# Patient Record
Sex: MALE | Race: WHITE | Employment: FULL TIME | URBAN - METROPOLITAN AREA
[De-identification: names, ages, dates, MRNs, and addresses within clinical notes are randomized per-mention and may not be internally consistent; named-entity substitution may affect disease eponyms.]

---

## 2020-12-15 ENCOUNTER — HOSPITAL ENCOUNTER (INPATIENT)
Facility: HOSPITAL | Age: 50
LOS: 1 days | Discharge: HOME | DRG: 176 | End: 2020-12-16
Attending: EMERGENCY MEDICINE | Admitting: HOSPITALIST
Payer: COMMERCIAL

## 2020-12-15 ENCOUNTER — APPOINTMENT (OUTPATIENT)
Dept: ULTRASOUND IMAGING | Facility: HOSPITAL | Age: 50
DRG: 176 | End: 2020-12-15
Attending: INTERNAL MEDICINE
Payer: COMMERCIAL

## 2020-12-15 ENCOUNTER — APPOINTMENT (OUTPATIENT)
Dept: CT IMAGING | Facility: HOSPITAL | Age: 50
DRG: 176 | End: 2020-12-15
Attending: EMERGENCY MEDICINE
Payer: COMMERCIAL

## 2020-12-15 ENCOUNTER — APPOINTMENT (OUTPATIENT)
Dept: GENERAL RADIOLOGY | Facility: HOSPITAL | Age: 50
DRG: 176 | End: 2020-12-15
Attending: EMERGENCY MEDICINE
Payer: COMMERCIAL

## 2020-12-15 DIAGNOSIS — I26.99 OTHER ACUTE PULMONARY EMBOLISM WITHOUT ACUTE COR PULMONALE (HCC): Primary | ICD-10-CM

## 2020-12-15 DIAGNOSIS — I26.99 PULMONARY INFARCT (HCC): ICD-10-CM

## 2020-12-15 PROCEDURE — 71260 CT THORAX DX C+: CPT | Performed by: EMERGENCY MEDICINE

## 2020-12-15 PROCEDURE — 99223 1ST HOSP IP/OBS HIGH 75: CPT | Performed by: HOSPITALIST

## 2020-12-15 PROCEDURE — 99222 1ST HOSP IP/OBS MODERATE 55: CPT | Performed by: INTERNAL MEDICINE

## 2020-12-15 PROCEDURE — 93970 EXTREMITY STUDY: CPT | Performed by: INTERNAL MEDICINE

## 2020-12-15 PROCEDURE — 71045 X-RAY EXAM CHEST 1 VIEW: CPT | Performed by: EMERGENCY MEDICINE

## 2020-12-15 RX ORDER — HEPARIN SODIUM 1000 [USP'U]/ML
80 INJECTION, SOLUTION INTRAVENOUS; SUBCUTANEOUS ONCE
Status: COMPLETED | OUTPATIENT
Start: 2020-12-15 | End: 2020-12-15

## 2020-12-15 RX ORDER — POTASSIUM CHLORIDE 20 MEQ/1
40 TABLET, EXTENDED RELEASE ORAL ONCE
Status: COMPLETED | OUTPATIENT
Start: 2020-12-15 | End: 2020-12-15

## 2020-12-15 RX ORDER — HEPARIN SODIUM AND DEXTROSE 10000; 5 [USP'U]/100ML; G/100ML
18 INJECTION INTRAVENOUS ONCE
Status: DISCONTINUED | OUTPATIENT
Start: 2020-12-15 | End: 2020-12-15

## 2020-12-15 RX ORDER — HEPARIN SODIUM AND DEXTROSE 10000; 5 [USP'U]/100ML; G/100ML
INJECTION INTRAVENOUS CONTINUOUS
Status: DISCONTINUED | OUTPATIENT
Start: 2020-12-15 | End: 2020-12-15

## 2020-12-15 NOTE — PLAN OF CARE
Problem: Patient Centered Care  Goal: Patient preferences are identified and integrated in the patient's plan of care  Description: Interventions:  - What would you like us to know as we care for you?  I would like to go home as soon as possible  - Provid retention  12/15/2020 1428 by Brina Wisdom RN  Outcome: Progressing    Patient is alert and orientated x4, on RA, VSS. Patient was bridged over from heparin gtt to xarelto. Patient is resting comfortable in bed at lowest position with bed alarm in place.  Gi

## 2020-12-15 NOTE — ED NOTES
Orders for admission, patient is aware of plan and ready to go upstairs, any questions, please call ED KRISTY Saldivar at ext: [phone]

## 2020-12-15 NOTE — PROGRESS NOTES
Inpatient to Inpatient Transfer    Reason for Transfer:  PE, on heparin gtt. Patient's Family Notified of Transfer and Given Unit Phone Number/Visiting Hours:  Yes, female  present with patient. Patient Detoxing?   No    Comments:  Patient

## 2020-12-15 NOTE — ED INITIAL ASSESSMENT (HPI)
Pt c/o left-sided rib pain for the past few days, reports hx rib injuries years ago, denies any recent trauma.

## 2020-12-15 NOTE — CONSULTS
[facility]    Consult Note    Date:  12/15/2020  Date of Admission:  12/15/2020      Chief Complaint:   Pollo Shipley is a(n) 48year old male with left chest pain. HPI:   The patient has no significant past medical history.   He has never b and no mass appreciable. Extremities without clubbing cyanosis nor edema. No palpable cord. Neurologic grossly intact with symmetric tone and strength and reflex.   Skin without gross abnormality    Results:     Lab Results   Component Value Date    WBC Assessment/Plan:   1. Venous thromboembolism–the patient has a mild clinical syndrome associated with pulmonary infarction and is hemodynamically stable. There is no evidence of right ventricular strain on scanning.   I think it reasonable to go dir

## 2020-12-15 NOTE — ED PROVIDER NOTES
Patient Seen in: [facility] Emergency Department      History   No chief complaint on file.     Stated Complaint: Rib pain    HPI    55-year-old male with no significant medical history presents to the emergency department for evaluation of left-si normal distal pulses  Pulmonary/Chest: CTA b/l with no rales, wheezes. No chest wall tenderness  Abdominal: Nontender. Nondistended. Soft. Bowel sounds are normal.   Back:   : Musculoskeletal: Normal range of motion. No deformity.    Lymphadenopathy: Luz Mendez MD on 12/15/2020 at 7:37 AM          Ct Chest Pe Aorta (iv Only) (cpt=71260)    Result Date: 12/15/2020  CONCLUSION:  1.  Positive for acute-appearing pulmonary emboli within the distal main left pulmonary artery that demonstrate lobar, segmental, an accepted patient for admission.     Critical care time exclusive of procedure time spent on this patient was 40 min for taking history from patient examining patient, medical decision-making, reviewing lab work and radiology studies, explaining results to p

## 2020-12-16 VITALS
TEMPERATURE: 98 F | DIASTOLIC BLOOD PRESSURE: 76 MMHG | WEIGHT: 212 LBS | SYSTOLIC BLOOD PRESSURE: 126 MMHG | HEIGHT: 73 IN | RESPIRATION RATE: 18 BRPM | HEART RATE: 66 BPM | OXYGEN SATURATION: 93 % | BODY MASS INDEX: 28.1 KG/M2

## 2020-12-16 PROCEDURE — 99239 HOSP IP/OBS DSCHRG MGMT >30: CPT | Performed by: HOSPITALIST

## 2020-12-16 PROCEDURE — 99232 SBSQ HOSP IP/OBS MODERATE 35: CPT | Performed by: INTERNAL MEDICINE

## 2020-12-16 RX ORDER — ACETAMINOPHEN 325 MG/1
650 TABLET ORAL EVERY 6 HOURS PRN
Status: DISCONTINUED | OUTPATIENT
Start: 2020-12-16 | End: 2020-12-16

## 2020-12-16 RX ORDER — HYDROCODONE BITARTRATE AND ACETAMINOPHEN 5; 325 MG/1; MG/1
1 TABLET ORAL EVERY 6 HOURS PRN
Status: DISCONTINUED | OUTPATIENT
Start: 2020-12-16 | End: 2020-12-16

## 2020-12-16 RX ORDER — HYDROCODONE BITARTRATE AND ACETAMINOPHEN 5; 325 MG/1; MG/1
1 TABLET ORAL EVERY 6 HOURS PRN
Qty: 20 TABLET | Refills: 0 | Status: SHIPPED | OUTPATIENT
Start: 2020-12-16 | End: 2021-04-27

## 2020-12-16 RX ORDER — ACETAMINOPHEN 325 MG/1
650 TABLET ORAL EVERY 6 HOURS PRN
Refills: 0 | Status: SHIPPED | COMMUNITY
Start: 2020-12-16 | End: 2021-01-08

## 2020-12-16 NOTE — H&P
[facility]    PATIENT'S NAME: CARO LEPE   ATTENDING PHYSICIAN: Shine Oakes MD   PATIENT ACCOUNT#:   [id number]    LOCATION:  [address] [place] [address] RECORD #:   [id number]       YOB: 1970  ADMISSION DATE:       12/15/2020 refer to the patient's chart for details regarding the patient's home medications. ALLERGIES:  No known drug allergies. FAMILY HISTORY:  Currently unknown.       REVIEW OF SYSTEMS:  A 10-point review of systems has been obtained and was otherwise understanding of the information given. Currently, the patient is on room air. We will continue to monitor and continue analgesics as needed. 2.   Left lower extremity deep venous thrombosis: We will continue anticoagulation as above.   Appreciate rec

## 2020-12-16 NOTE — PROGRESS NOTES
[facility]    Progress Note      Assessment and Plan:   1. Venous thromboembolism–the patient has a mild clinical syndrome associated with pulmonary infarction and is hemodynamically stable.   There is no evidence of right ventricular strai 12/16/2020    CREATSERUM 0.96 12/16/2020    BUN 17 12/16/2020     12/16/2020    K 4.3 12/16/2020     12/16/2020    CO2 25.0 12/16/2020     12/16/2020    CA 9.3 12/16/2020     Lower extremity venous ultrasound–left popliteal thrombus    P

## 2020-12-16 NOTE — CM/SW NOTE
Received MDO for Xarelto - Dr. Jackie Meehan called into pt's pharmacy, pt OOP cost will be $95.    SW obtained Xarelto coupon and provided to pt in his room. SW provided pt w/ instructions to bring coupon to his Pharmacy.  SW informed pt of OOP expense for $95

## 2020-12-16 NOTE — DISCHARGE SUMMARY
[facility]    Discharge Summary    Migdalia Anthony Patient Status:  Inpatient    7/10/1970 MRN [id number]   [facility] 3W/SW Attending Soniya Blunt MD   Hosp Day # 1 PCP None Pcp     Date of Admission: 12/15/2020     D contacts. Is endorsing pleuritic left-sided chest pain.        DISCHARGE DX: unprovoked DVT, PE  No clear reason for his symptoms  Discussed at length this could be caused by occult malignancy.   Jeffery Muhammad was on speakerphone  Gave clear instructions to take X 12/15/2020  CONCLUSION:  1. Positive for acute-appearing pulmonary emboli within the distal main left pulmonary artery that demonstrate lobar, segmental, and subsegmental branch propagation into the left upper and left lower lobes.   Additional right upper 12/16/20  0709   * 118*   BUN 12 17   CREATSERUM 0.95 0.96   GFRAA 107 106   GFRNAA 93 92   CA 9.7 9.3    139   K 3.8 4.3    107   CO2 24.0 25.0     Lab Results   Component Value Date    INR 1.13 12/15/2020       Discharge Medications: Discharge References/Attachments    Rivaroxaban oral tablets (English)         ----------------------------------------------------  >30 MIN SPENT ON THIS DC   GAYLA PEOPLES  12/16/2020  9:24 AM

## 2020-12-16 NOTE — PLAN OF CARE
Pt c/o HA-- MD notified. Resolved after PRN Tylenol. Discharging back home today. All questions answered to the best of my ability.      Problem: Patient Centered Care  Goal: Patient preferences are identified and integrated in the patient's plan of care  D anxiety  - Monitor for signs/symptoms of CO2 retention  Outcome: Completed     Problem: PAIN - ADULT  Goal: Verbalizes/displays adequate comfort level or patient's stated pain goal  Description: INTERVENTIONS:  - Encourage pt to monitor pain and request as

## 2020-12-16 NOTE — DISCHARGE PLANNING
Patient was provided with discharge instructions, education, and follow up information; patient's significant other Nandini Wilkins was also present for discharge instructions with patient's consent.  Prescriptions were already sent electronically to patient's pharma

## 2020-12-16 NOTE — PLAN OF CARE
Patient resting comfortably overnight. Pain to left side of ribcage, declined the need for pain medication. No signs or symptoms of bleeding noted. Up independently. Voiding freely. Plan for possible d/c home today.     Problem: Patient Centered Care  Goal: respiratory difficulty  - Respiratory Therapy support as indicated  - Manage/alleviate anxiety  - Monitor for signs/symptoms of CO2 retention  Outcome: Progressing

## 2020-12-17 ENCOUNTER — PATIENT OUTREACH (OUTPATIENT)
Dept: CASE MANAGEMENT | Age: 50
End: 2020-12-17

## 2020-12-17 DIAGNOSIS — Z02.9 ENCOUNTERS FOR UNSPECIFIED ADMINISTRATIVE PURPOSE: ICD-10-CM

## 2020-12-17 NOTE — PAYOR COMM NOTE
--------------  ADMISSION REVIEW     Payor: Johnnie Arcos #:  [id number]  Authorization Number: [id number]    Admit date: 12/15/20  Admit time: [address]       Admitting Physician: Jolyn Libman, MD  Attending Physician:  No att. provide PERRLA  Neck: Normal range of motion. Neck supple. No tracheal deviation present. CV: s1s2+, RRR, no m/r/g, normal distal pulses  Pulmonary/Chest: CTA b/l with no rales, wheezes. No chest wall tenderness  Abdominal: Nontender. Nondistended. Soft.  Bowel Dictated by (CST): Rebekah Lopez MD on 12/15/2020 at 7:34 AM     Finalized by (CST): Rebekah Lopez MD on 12/15/2020 at 7:37 AM          Ct Chest Pe Aorta (iv Only) (cpt=71260)    Result Date: 12/15/2020  CONCLUSION:  1.  Positive for acute-appearing pul spent on this patient was 40 min for taking history from patient examining patient, medical decision-making, reviewing lab work and radiology studies, explaining results to patient and family, discussing with consultants/admitting physician, and documentin pulmonary emboli were noted. The patient was also found to have a large 7 cm wedge shaped peripheral left basilar opacity in left lower lobe and lingula, most likely related to a pulmonary infarct. Pulmonology was placed on consult.     The patient was st No evidence of DVT in the right lower extremity. ASSESSMENT AND PLAN:  The patient is a 80-year-old male with no significant past medical history who has come to the hospital endorsing pleuritic chest pain.       1.   Pleuritic chest pain:  Most likely

## 2020-12-21 NOTE — PAYOR COMM NOTE
--------------  DISCHARGE REVIEW    Payor: Johnnie Sainz  #:  [id number]  Authorization Number: [id number]    Admit date: 12/15/20  Admit time:  8684  Discharge Date: 12/16/2020 12:20 PM     Admitting Physician: Brody Rubio MD  At in left lower lobe and lingula, most likely related to a pulmonary infarct. Pulmonology was placed on consult. The patient was started on heparin drip. Pulmonology recommended the patient be changed over to Xarelto.   The patient was seen and examined on 12/15/2020 at 1:47 PM     Finalized by (CST): Estefania Ac MD on 12/15/2020 at 1:50 PM          Xr Chest Ap Portable  (cpt=71045)    Result Date: 12/15/2020  CONCLUSION:  1.  Poorly defined 4.1 x 3.0 cm opacity at the left lung base may be pleural based, MD on 12/15/2020 at 10:01 AM            LABS :     Lab Results   Component Value Date    WBC 8.4 12/16/2020    HGB 16.2 12/16/2020    HCT 47.5 12/16/2020    .0 12/16/2020    CREATSERUM 0.96 12/16/2020    BUN 17 12/16/2020     12/16/2020    K 4 Hours: 24-hours Phone: [phone]   · HYDROcodone-acetaminophen 5-325 MG Tabs  · Rivaroxaban 20 MG Tabs         Follow up Visits  MD Jose Barber 8141 [phone]    Schedule an appointment as soon as possible

## 2020-12-22 ENCOUNTER — TELEPHONE (OUTPATIENT)
Dept: PULMONOLOGY | Facility: CLINIC | Age: 50
End: 2020-12-22

## 2020-12-22 ENCOUNTER — OFFICE VISIT (OUTPATIENT)
Dept: INTERNAL MEDICINE CLINIC | Facility: CLINIC | Age: 50
End: 2020-12-22
Payer: COMMERCIAL

## 2020-12-22 ENCOUNTER — PATIENT OUTREACH (OUTPATIENT)
Dept: CASE MANAGEMENT | Age: 50
End: 2020-12-22

## 2020-12-22 VITALS
HEART RATE: 78 BPM | RESPIRATION RATE: 18 BRPM | OXYGEN SATURATION: 99 % | BODY MASS INDEX: 27.17 KG/M2 | HEIGHT: 73 IN | WEIGHT: 205 LBS | SYSTOLIC BLOOD PRESSURE: 132 MMHG | TEMPERATURE: 98 F | DIASTOLIC BLOOD PRESSURE: 88 MMHG

## 2020-12-22 DIAGNOSIS — R73.09 ELEVATED GLUCOSE LEVEL: ICD-10-CM

## 2020-12-22 DIAGNOSIS — R93.89 ABNORMAL CT OF THE CHEST: Primary | ICD-10-CM

## 2020-12-22 DIAGNOSIS — Z12.5 SCREENING PSA (PROSTATE SPECIFIC ANTIGEN): ICD-10-CM

## 2020-12-22 DIAGNOSIS — Z28.21 IMMUNIZATION REFUSED: ICD-10-CM

## 2020-12-22 DIAGNOSIS — Z00.00 ENCOUNTER FOR PREVENTIVE CARE: ICD-10-CM

## 2020-12-22 DIAGNOSIS — Z71.6 ENCOUNTER FOR SMOKING CESSATION COUNSELING: ICD-10-CM

## 2020-12-22 DIAGNOSIS — R53.83 OTHER FATIGUE: ICD-10-CM

## 2020-12-22 DIAGNOSIS — I26.99 OTHER PULMONARY EMBOLISM WITHOUT ACUTE COR PULMONALE, UNSPECIFIED CHRONICITY (HCC): Primary | ICD-10-CM

## 2020-12-22 DIAGNOSIS — Z76.89 ENCOUNTER TO ESTABLISH CARE: ICD-10-CM

## 2020-12-22 PROCEDURE — 3079F DIAST BP 80-89 MM HG: CPT | Performed by: INTERNAL MEDICINE

## 2020-12-22 PROCEDURE — 3075F SYST BP GE 130 - 139MM HG: CPT | Performed by: INTERNAL MEDICINE

## 2020-12-22 PROCEDURE — 99205 OFFICE O/P NEW HI 60 MIN: CPT | Performed by: INTERNAL MEDICINE

## 2020-12-22 PROCEDURE — 1111F DSCHRG MED/CURRENT MED MERGE: CPT | Performed by: INTERNAL MEDICINE

## 2020-12-22 PROCEDURE — 3008F BODY MASS INDEX DOCD: CPT | Performed by: INTERNAL MEDICINE

## 2020-12-22 NOTE — PROGRESS NOTES
Patient called requesting assistance with scheduling.                 Renay Crow MD  Pulmonary   [address]  [phone]       's office contacted patient appointment made  On Jan 18th @ 4:45       Closing en

## 2020-12-22 NOTE — TELEPHONE ENCOUNTER
Spoke to patient regarding message below. Appointment scheduled 1/18/20 at 4:45 pm.  Verified date, time, place and where to park. Inquiring about CT scan of the chest at 2-month interval.  Last CT Chest 12/15/2020. No future order in system for CT. Dr. Gloria Husain- please advise.

## 2020-12-22 NOTE — TELEPHONE ENCOUNTER
Bear Negro requesting patient to be seen in one month for hospital follow up. Please call. Thank you.

## 2020-12-23 NOTE — TELEPHONE ENCOUNTER
Spoke to patient regarding message below. CT Chest ordered and patient informed to call central scheduling to schedule CT @ [phone]. Patient verbalized understanding. CT CHEST placed in chronic calendar.

## 2020-12-23 NOTE — PROGRESS NOTES
HPI:    Patient ID: John Chaparro is a 48year old male. HPI   Patient here to establish care from the hospital where he was admitted with acute pulmonary embolism also DVT to lower extremity. Patient was sent home with rivaroxaban.   Overall doing okay sh Known Allergies    HISTORY:  Past Medical History:   Diagnosis Date   • Anxiety state    • Depression       No past surgical history on file. No family history on file.    Social History: Social History    Tobacco Use      Smoking status: Current Every Da (primary encounter diagnosis)- stable no  AC< no cp, o2 is good   Encounter for preventive care- will refer to gI for screening colonoscopy  Encounter for smoking cessation counseling- doing ok, not smoking,g using only menthol weep no ni  Elevated glucose

## 2020-12-28 ENCOUNTER — TELEPHONE (OUTPATIENT)
Dept: INTERNAL MEDICINE CLINIC | Facility: CLINIC | Age: 50
End: 2020-12-28

## 2020-12-28 NOTE — TELEPHONE ENCOUNTER
Dr Kobi Burton, please advise. Jeannette Mcleod called for patient. Seen in OV 12/22/2020. Was to return to work today, but stated patient is still not feeling well, and he has a couple appointments for follow-up this week.  He is breathing \"ok\" but he is still having

## 2020-12-29 ENCOUNTER — OFFICE VISIT (OUTPATIENT)
Dept: HEMATOLOGY/ONCOLOGY | Facility: HOSPITAL | Age: 50
End: 2020-12-29
Attending: INTERNAL MEDICINE
Payer: COMMERCIAL

## 2020-12-29 VITALS
BODY MASS INDEX: 27.17 KG/M2 | OXYGEN SATURATION: 98 % | SYSTOLIC BLOOD PRESSURE: 135 MMHG | WEIGHT: 205 LBS | HEIGHT: 73 IN | TEMPERATURE: 98 F | HEART RATE: 63 BPM | DIASTOLIC BLOOD PRESSURE: 80 MMHG | RESPIRATION RATE: 16 BRPM

## 2020-12-29 DIAGNOSIS — Z71.6 ENCOUNTER FOR SMOKING CESSATION COUNSELING: ICD-10-CM

## 2020-12-29 DIAGNOSIS — Z87.891 HISTORY OF TOBACCO USE: ICD-10-CM

## 2020-12-29 DIAGNOSIS — I26.99 OTHER PULMONARY EMBOLISM WITHOUT ACUTE COR PULMONALE, UNSPECIFIED CHRONICITY (HCC): Primary | ICD-10-CM

## 2020-12-29 DIAGNOSIS — R31.29 MICROSCOPIC HEMATURIA: ICD-10-CM

## 2020-12-29 LAB
ALBUMIN SERPL-MCNC: 4 G/DL (ref 3.4–5)
ALBUMIN/GLOB SERPL: 0.9 {RATIO} (ref 1–2)
ALP LIVER SERPL-CCNC: 82 U/L
ALT SERPL-CCNC: 28 U/L
ANION GAP SERPL CALC-SCNC: 4 MMOL/L (ref 0–18)
AST SERPL-CCNC: 14 U/L (ref 15–37)
BACTERIA UR QL AUTO: NEGATIVE /HPF
BILIRUB SERPL-MCNC: 1 MG/DL (ref 0.1–2)
BILIRUB UR QL: NEGATIVE
BUN BLD-MCNC: 16 MG/DL (ref 7–18)
BUN/CREAT SERPL: 15.1 (ref 10–20)
CALCIUM BLD-MCNC: 9.5 MG/DL (ref 8.5–10.1)
CHLORIDE SERPL-SCNC: 108 MMOL/L (ref 98–112)
CLARITY UR: CLEAR
CO2 SERPL-SCNC: 28 MMOL/L (ref 21–32)
COLOR UR: YELLOW
COMPLEXED PSA SERPL-MCNC: 0.84 NG/ML (ref ?–4)
CREAT BLD-MCNC: 1.06 MG/DL
GLOBULIN PLAS-MCNC: 4.3 G/DL (ref 2.8–4.4)
GLUCOSE BLD-MCNC: 97 MG/DL (ref 70–99)
GLUCOSE UR-MCNC: NEGATIVE MG/DL
KETONES UR-MCNC: NEGATIVE MG/DL
LEUKOCYTE ESTERASE UR QL STRIP.AUTO: NEGATIVE
M PROTEIN MFR SERPL ELPH: 8.3 G/DL (ref 6.4–8.2)
NITRITE UR QL STRIP.AUTO: NEGATIVE
OSMOLALITY SERPL CALC.SUM OF ELEC: 291 MOSM/KG (ref 275–295)
PATIENT FASTING Y/N/NP: NO
PH UR: 5 [PH] (ref 5–8)
POTASSIUM SERPL-SCNC: 4.2 MMOL/L (ref 3.5–5.1)
PROT UR-MCNC: NEGATIVE MG/DL
RBC #/AREA URNS AUTO: 9 /HPF
SODIUM SERPL-SCNC: 140 MMOL/L (ref 136–145)
SP GR UR STRIP: 1.02 (ref 1–1.03)
UROBILINOGEN UR STRIP-ACNC: <2
WBC #/AREA URNS AUTO: 0 /HPF

## 2020-12-29 PROCEDURE — 99205 OFFICE O/P NEW HI 60 MIN: CPT | Performed by: INTERNAL MEDICINE

## 2020-12-29 NOTE — PROGRESS NOTES
Hematology Consultation Note    Patient Name: Art Harman   YOB: 1970   Medical Record Number: [id number]   CSN: [id number]   Consulting Physician: Romelia Raymundo MD  Referring Physician(s): Margeret Claude, MD  Date of Consultation: 12/29/2020 an event. He denies any systemic signs of illness. His review of systems is otherwise negative.     Past Medical History:  Past Medical History:   Diagnosis Date   • Anxiety state    • Depression    • [facility] 12/15/2020   • Pulm on file        Relationship status: Not on file      Intimate partner violence        Fear of current or ex partner: Not on file        Emotionally abused: Not on file        Physically abused: Not on file        Forced sexual activity: Not on file    Othe 1\")   Wt 93 kg (205 lb)   SpO2 98%   BMI 27.05 kg/m²     Physical Examination:    General: Patient is alert and oriented x 3, not in acute distress. Psych:  Friendly, cooperative with appropriate questions and responses  HEENT: EOMs intact.  Oropharynx is distal main left pulmonary artery that demonstrate lobar, segmental, and subsegmental branch propagation into the left upper and left lower lobes. Additional right upper lobe segmental branch   pulmonary emboli. No CT evidence of right heart strain.   2. underlying mass cannot be excluded.  He has been seen by Dr. Felicita Carter in pulmonary for this finding who has already scheduled this pt for repeat CT chest imaging scan    --I have reviewed with the patient & his long-term girlfriend that this appears to be an u Margie to r/o a possible underlying mass    --as this was an unprovoked DVT/PE, Sanchez Salgado is aware that he should remain on Eliquis for at least 6 months. He is tolerating this medication without any bleeding or bruising complications.   Following 6 months of fu

## 2020-12-29 NOTE — PROGRESS NOTES
Multiple attempts to reach the pt and messages left with no returned phone call. Pt went to HFU with PCP on 12/22/20, closing encounter.

## 2021-01-02 ENCOUNTER — TELEPHONE (OUTPATIENT)
Dept: INTERNAL MEDICINE CLINIC | Facility: CLINIC | Age: 51
End: 2021-01-02

## 2021-01-02 NOTE — TELEPHONE ENCOUNTER
Patti Rahman reports patient was given work note to return to work on 1/4/20 however still not feeling well and requesting to postpone and return to work on 1/11, will need note by today, pended under communications for review please advise.     Patti Rahman requesting c

## 2021-01-02 NOTE — TELEPHONE ENCOUNTER
Work note faxed to employer, confirmation received, copy sent to [employer]. Sharona Flynn has been advised.

## 2021-01-06 ENCOUNTER — LAB ENCOUNTER (OUTPATIENT)
Dept: LAB | Age: 51
End: 2021-01-06
Attending: INTERNAL MEDICINE
Payer: COMMERCIAL

## 2021-01-06 DIAGNOSIS — R77.8 ELEVATED TOTAL PROTEIN: ICD-10-CM

## 2021-01-06 DIAGNOSIS — R31.9 HEMATURIA, UNSPECIFIED TYPE: ICD-10-CM

## 2021-01-06 LAB
BACTERIA UR QL AUTO: NEGATIVE /HPF
BILIRUB UR QL: NEGATIVE
CHOLEST SMN-MCNC: 211 MG/DL (ref ?–200)
CLARITY UR: CLEAR
COLOR UR: YELLOW
EST. AVERAGE GLUCOSE BLD GHB EST-MCNC: 126 MG/DL (ref 68–126)
GLUCOSE UR-MCNC: NEGATIVE MG/DL
HBA1C MFR BLD HPLC: 6 % (ref ?–5.7)
HDLC SERPL-MCNC: 57 MG/DL (ref 40–59)
KETONES UR-MCNC: NEGATIVE MG/DL
LDLC SERPL CALC-MCNC: 107 MG/DL (ref ?–100)
LEUKOCYTE ESTERASE UR QL STRIP.AUTO: NEGATIVE
NITRITE UR QL STRIP.AUTO: NEGATIVE
NONHDLC SERPL-MCNC: 154 MG/DL (ref ?–130)
PATIENT FASTING Y/N/NP: YES
PH UR: 6 [PH] (ref 5–8)
PROT UR-MCNC: NEGATIVE MG/DL
RBC #/AREA URNS AUTO: 5 /HPF
SP GR UR STRIP: 1.02 (ref 1–1.03)
TRIGL SERPL-MCNC: 237 MG/DL (ref 30–149)
TSI SER-ACNC: 1.12 MIU/ML (ref 0.36–3.74)
UROBILINOGEN UR STRIP-ACNC: <2
VLDLC SERPL CALC-MCNC: 47 MG/DL (ref 0–30)
WBC #/AREA URNS AUTO: <1 /HPF

## 2021-01-06 PROCEDURE — 83036 HEMOGLOBIN GLYCOSYLATED A1C: CPT | Performed by: INTERNAL MEDICINE

## 2021-01-06 PROCEDURE — 84443 ASSAY THYROID STIM HORMONE: CPT | Performed by: INTERNAL MEDICINE

## 2021-01-06 PROCEDURE — 84165 PROTEIN E-PHORESIS SERUM: CPT

## 2021-01-06 PROCEDURE — 36415 COLL VENOUS BLD VENIPUNCTURE: CPT | Performed by: INTERNAL MEDICINE

## 2021-01-06 PROCEDURE — 80061 LIPID PANEL: CPT | Performed by: INTERNAL MEDICINE

## 2021-01-06 PROCEDURE — 81001 URINALYSIS AUTO W/SCOPE: CPT | Performed by: INTERNAL MEDICINE

## 2021-01-06 PROCEDURE — 88108 CYTOPATH CONCENTRATE TECH: CPT

## 2021-01-07 ENCOUNTER — HOSPITAL ENCOUNTER (OUTPATIENT)
Dept: CT IMAGING | Facility: HOSPITAL | Age: 51
Discharge: HOME | End: 2021-01-07
Attending: INTERNAL MEDICINE
Payer: COMMERCIAL

## 2021-01-07 DIAGNOSIS — Z87.891 HISTORY OF TOBACCO USE: ICD-10-CM

## 2021-01-07 DIAGNOSIS — R31.29 MICROSCOPIC HEMATURIA: ICD-10-CM

## 2021-01-07 DIAGNOSIS — Z71.6 ENCOUNTER FOR SMOKING CESSATION COUNSELING: ICD-10-CM

## 2021-01-07 LAB
ALBUMIN SERPL ELPH-MCNC: 4.43 G/DL (ref 3.75–5.21)
ALBUMIN/GLOB SERPL: 1.44 {RATIO} (ref 1–2)
ALPHA1 GLOB SERPL ELPH-MCNC: 0.3 G/DL (ref 0.19–0.46)
ALPHA2 GLOB SERPL ELPH-MCNC: 0.75 G/DL (ref 0.48–1.05)
B-GLOBULIN SERPL ELPH-MCNC: 0.92 G/DL (ref 0.68–1.23)
GAMMA GLOB SERPL ELPH-MCNC: 1.1 G/DL (ref 0.62–1.7)
M PROTEIN MFR SERPL ELPH: 7.5 G/DL (ref 6.4–8.2)
NON GYNE INTERPRETATION: NEGATIVE

## 2021-01-07 PROCEDURE — 76377 3D RENDER W/INTRP POSTPROCES: CPT | Performed by: INTERNAL MEDICINE

## 2021-01-07 PROCEDURE — 74178 CT ABD&PLV WO CNTR FLWD CNTR: CPT | Performed by: INTERNAL MEDICINE

## 2021-01-07 NOTE — PROGRESS NOTES
The BS and the cholesterol are  on the higher side, will need to watch diet, cut down on carbs, rice, pasta, potatoes, one cup per serving, bread 2 slices wheat better than white, no sodas or othr sugary drinks, cut down on fried fatty food, eat more green

## 2021-01-08 ENCOUNTER — OFFICE VISIT (OUTPATIENT)
Dept: INTERNAL MEDICINE CLINIC | Facility: CLINIC | Age: 51
End: 2021-01-08
Payer: COMMERCIAL

## 2021-01-08 ENCOUNTER — NURSE TRIAGE (OUTPATIENT)
Dept: INTERNAL MEDICINE CLINIC | Facility: CLINIC | Age: 51
End: 2021-01-08

## 2021-01-08 VITALS
BODY MASS INDEX: 28.1 KG/M2 | HEIGHT: 73 IN | DIASTOLIC BLOOD PRESSURE: 74 MMHG | RESPIRATION RATE: 18 BRPM | OXYGEN SATURATION: 99 % | HEART RATE: 85 BPM | TEMPERATURE: 98 F | WEIGHT: 212 LBS | SYSTOLIC BLOOD PRESSURE: 112 MMHG

## 2021-01-08 DIAGNOSIS — R77.8 ELEVATED TOTAL PROTEIN: ICD-10-CM

## 2021-01-08 DIAGNOSIS — T78.40XA ALLERGIC REACTION, INITIAL ENCOUNTER: ICD-10-CM

## 2021-01-08 DIAGNOSIS — R73.09 ELEVATED GLUCOSE: ICD-10-CM

## 2021-01-08 DIAGNOSIS — R21 RASH: Primary | ICD-10-CM

## 2021-01-08 DIAGNOSIS — E78.5 HYPERLIPIDEMIA, UNSPECIFIED HYPERLIPIDEMIA TYPE: ICD-10-CM

## 2021-01-08 PROCEDURE — 3078F DIAST BP <80 MM HG: CPT | Performed by: INTERNAL MEDICINE

## 2021-01-08 PROCEDURE — 3008F BODY MASS INDEX DOCD: CPT | Performed by: INTERNAL MEDICINE

## 2021-01-08 PROCEDURE — 99214 OFFICE O/P EST MOD 30 MIN: CPT | Performed by: INTERNAL MEDICINE

## 2021-01-08 PROCEDURE — 3074F SYST BP LT 130 MM HG: CPT | Performed by: INTERNAL MEDICINE

## 2021-01-08 RX ORDER — ACETAMINOPHEN 325 MG/1
650 TABLET ORAL EVERY 6 HOURS PRN
Qty: 10 TABLET | Refills: 0 | OUTPATIENT
Start: 2021-01-08 | End: 2021-01-08

## 2021-01-08 RX ORDER — METHYLPREDNISOLONE 4 MG/1
TABLET ORAL
Qty: 1 KIT | Refills: 0 | Status: SHIPPED | OUTPATIENT
Start: 2021-01-08 | End: 2021-02-10 | Stop reason: ALTCHOICE

## 2021-01-08 NOTE — PROGRESS NOTES
HPI:    Patient ID: John Chaparro is a 48year old male.     HPI  Today with complaint of rash he says yesterday he had a CT scan done with contrast and in the evening developed a rash throughout the body itchy no shortness of breath no throat tightness no whe RASH   PHYSICAL EXAM:   Physical Exam   Constitutional: He is oriented to person, place, and time. He appears well-developed and well-nourished. HENT:   Head: Normocephalic and atraumatic. Eyes: Pupils are equal, round, and reactive to light.  Conjuncti

## 2021-01-08 NOTE — TELEPHONE ENCOUNTER
Action Requested: Summary for Provider     []  Critical Lab, Recommendations Needed  [] Need Additional Advice  []   FYI    []   Need Orders  [] Need Medications Sent to Pharmacy  []  Other     SUMMARY: Pt had a CT Urogram 1/7/21 with dye in the evening.  B

## 2021-01-11 ENCOUNTER — TELEPHONE (OUTPATIENT)
Dept: SURGERY | Facility: CLINIC | Age: 51
End: 2021-01-11

## 2021-01-11 ENCOUNTER — TELEPHONE (OUTPATIENT)
Dept: HEMATOLOGY/ONCOLOGY | Facility: HOSPITAL | Age: 51
End: 2021-01-11

## 2021-01-11 NOTE — TELEPHONE ENCOUNTER
Clyde Hernandez called stating Colton Solomon, had a CT scan on 1/7/21 and he has another CT Scan on 2/21/21 and a f/u wiith Dr. Carrie Gr on 2/24/21. [facility] wanted to know if Dr. Carrie Gr wants to see Ruthie Cannon before the 2/2/4/21 f/u.  Please call Clyde Hernandez at [phone]

## 2021-01-11 NOTE — TELEPHONE ENCOUNTER
Per Dr. Carmelo Schneider; call patient and offer sooner consult for tomorrow @ 11:00 am.  Keep his consult in Feb for possible cysto. I contacted patient. Patient declined consult for tomorrow, stating he is just back at work now.  Patient states he husam

## 2021-01-12 ENCOUNTER — OFFICE VISIT (OUTPATIENT)
Dept: HEMATOLOGY/ONCOLOGY | Facility: HOSPITAL | Age: 51
End: 2021-01-12
Attending: INTERNAL MEDICINE
Payer: COMMERCIAL

## 2021-01-12 VITALS
HEIGHT: 72.99 IN | WEIGHT: 204 LBS | BODY MASS INDEX: 27.04 KG/M2 | RESPIRATION RATE: 16 BRPM | OXYGEN SATURATION: 96 % | HEART RATE: 75 BPM | TEMPERATURE: 98 F

## 2021-01-12 DIAGNOSIS — R31.29 MICROSCOPIC HEMATURIA: ICD-10-CM

## 2021-01-12 DIAGNOSIS — R77.8 ELEVATED TOTAL PROTEIN: Primary | ICD-10-CM

## 2021-01-12 DIAGNOSIS — I26.99 OTHER PULMONARY EMBOLISM WITHOUT ACUTE COR PULMONALE, UNSPECIFIED CHRONICITY (HCC): ICD-10-CM

## 2021-01-12 PROCEDURE — 99214 OFFICE O/P EST MOD 30 MIN: CPT | Performed by: INTERNAL MEDICINE

## 2021-01-12 NOTE — PROGRESS NOTES
Hematology Progress Note    Patient Name: Jannette Vanegas   YOB: 1970   Medical Record Number: [id number]   CSN: [id number]   Consulting Physician: Monet Gaxiola MD  Referring Physician(s): Kay Phillips MD  Date of Visit: 1/12/2021     Chief comp which could have provoked such an event. He denies any systemic signs of illness. His review of systems is otherwise negative. Interval History:  Patient returns after initial consultation where he was found to have microscopic hematuria.   This prompt Less than monthly        Comment: occasional      Drug use: Never      Sexual activity: Not on file    Lifestyle      Physical activity        Days per week: Not on file        Minutes per session: Not on file      Stress: Not on file    Relationships weakness. Neurological: Negative for headaches, dizziness, speech problems, gait problems and weakness. A comprehensive 14 point review of systems was completed. Pertinent positives and negatives noted in the the HPI.      Vital Signs:  Pulse 75   Temp faint possible band in the gamma region.      Recommend clinical correlation and correlation with additional studies including serum immunofixation, serum immunoglobulin levels, free serum immunoglobulin light chains with ratio and urine electrophoresis /im he is greater than age 39, no family history of DVT or PE. This is his first event of DVT PE; therefore, does not meet criteria for a hypercoagulable work-up.      --We have discussed that he does not know his family history too well, but hereditary condit procedures in the next 6 months in light of him having an acute VTE.      2.) Microscopic hematuria with history of tobacco use    --noted incidentally on his urinalysis that his PCP ordered for him  --discussed with urology.  In light of his prolonged toba

## 2021-01-13 ENCOUNTER — TELEPHONE (OUTPATIENT)
Dept: PULMONOLOGY | Facility: CLINIC | Age: 51
End: 2021-01-13

## 2021-01-13 DIAGNOSIS — R93.89 ABNORMAL CT SCAN, CHEST: Primary | ICD-10-CM

## 2021-01-13 NOTE — TELEPHONE ENCOUNTER
Pts girlfriend Leonard Huggins states pt has appt scheduled with Dr. Jennifer Ambrosio for 1/18/21. Pt is not having second CT done until 2/21/21. Should pt wait to see Dr. Jennifer Ambrosio until after second CT?    FYI - pt had allergic reaction to dye on last CT.   Pt was told to let D

## 2021-01-15 NOTE — TELEPHONE ENCOUNTER
RN, you can add the patient on Monday or Tuesday.   I did want to repeat his CT scan the chest but I wanted to do it another month from now

## 2021-01-15 NOTE — TELEPHONE ENCOUNTER
It appears pt's girlfriend cancelled his appt on Monday w/ Dr. Janneth Machuca. Dr. Janneth Machuca- when do you want pt to f/u? Do you want to rx CT chest w/o contrast based on the reaction he had described below?

## 2021-01-15 NOTE — TELEPHONE ENCOUNTER
Per discharge summary 12/16/20 pt to schedule an appt w/ Dr. Raymundo schmitz for visit in 1 mo. Pt gave consent for our office to discuss his PHI w/ Nandini Wilkins (girlfriend). Informed pt that he may fill out ELLIE during upcoming appt if he wishes.  Per pt after second

## 2021-01-16 NOTE — TELEPHONE ENCOUNTER
RN, as long as he is doing okay, he can see me in the office after the next CAT scan in February. The CAT scan is being done to follow-up nodular infiltrates.

## 2021-01-18 NOTE — TELEPHONE ENCOUNTER
Dr. Bret Spurling- do you want to rx CT chest w/o contrast instead of w/ contrast based on pt's reaction described in RN's documentation below dated 1/15/21 3:15 pm?

## 2021-01-20 NOTE — TELEPHONE ENCOUNTER
Chronic calendar updated w/ new rx for CT chest w/o contrast.    Spoke to pt re: below including Dr. Miya Rodney orders. Pt gave consent for our office to discuss his PHI w/ Baljeet Bai.  Explained he may fill out ELLIE during upcoming appt (verbal consent required d/t

## 2021-01-24 ENCOUNTER — LAB ENCOUNTER (OUTPATIENT)
Dept: LAB | Facility: HOSPITAL | Age: 51
End: 2021-01-24
Attending: INTERNAL MEDICINE
Payer: COMMERCIAL

## 2021-01-24 DIAGNOSIS — R77.8 ELEVATED TOTAL PROTEIN: ICD-10-CM

## 2021-01-24 LAB
M PROTEIN 24H UR ELPH-MRATE: 183.6 MG/24 HR (ref ?–149.1)
SPECIMEN VOL UR: 1340 ML

## 2021-01-24 PROCEDURE — 86335 IMMUNFIX E-PHORSIS/URINE/CSF: CPT

## 2021-01-24 PROCEDURE — 84166 PROTEIN E-PHORESIS/URINE/CSF: CPT

## 2021-01-24 PROCEDURE — 84156 ASSAY OF PROTEIN URINE: CPT

## 2021-01-25 ENCOUNTER — TELEPHONE (OUTPATIENT)
Dept: PULMONOLOGY | Facility: CLINIC | Age: 51
End: 2021-01-25

## 2021-01-25 NOTE — TELEPHONE ENCOUNTER
Pt due for upcoming CT due 2/15/2021, letter mailed to pt.  [facility] care please advise

## 2021-01-26 ENCOUNTER — HOSPITAL ENCOUNTER (OUTPATIENT)
Dept: GENERAL RADIOLOGY | Facility: HOSPITAL | Age: 51
Discharge: HOME | End: 2021-01-26
Attending: INTERNAL MEDICINE
Payer: COMMERCIAL

## 2021-01-26 ENCOUNTER — LAB ENCOUNTER (OUTPATIENT)
Dept: LAB | Facility: HOSPITAL | Age: 51
End: 2021-01-26
Attending: INTERNAL MEDICINE
Payer: COMMERCIAL

## 2021-01-26 DIAGNOSIS — R77.8 ELEVATED TOTAL PROTEIN: ICD-10-CM

## 2021-01-26 LAB
IGA SERPL-MCNC: 192 MG/DL (ref 70–312)
IGM SERPL-MCNC: 223 MG/DL (ref 43–279)
IMMUNOGLOBULIN PNL SER-MCNC: 986 MG/DL (ref 791–1643)

## 2021-01-26 PROCEDURE — 86334 IMMUNOFIX E-PHORESIS SERUM: CPT

## 2021-01-26 PROCEDURE — 82784 ASSAY IGA/IGD/IGG/IGM EACH: CPT

## 2021-01-26 PROCEDURE — 77075 RADEX OSSEOUS SURVEY COMPL: CPT | Performed by: INTERNAL MEDICINE

## 2021-01-26 PROCEDURE — 36415 COLL VENOUS BLD VENIPUNCTURE: CPT

## 2021-01-26 PROCEDURE — 83883 ASSAY NEPHELOMETRY NOT SPEC: CPT

## 2021-01-26 PROCEDURE — 84165 PROTEIN E-PHORESIS SERUM: CPT

## 2021-01-28 LAB
KAPPA LC FREE SER-MCNC: 1.6 MG/DL (ref 0.33–1.94)
KAPPA LC FREE/LAMBDA FREE SER NEPH: 1.08 {RATIO} (ref 0.26–1.65)
LAMBDA LC FREE SERPL-MCNC: 1.48 MG/DL (ref 0.57–2.63)

## 2021-01-29 ENCOUNTER — TELEPHONE (OUTPATIENT)
Dept: INTERNAL MEDICINE CLINIC | Facility: CLINIC | Age: 51
End: 2021-01-29

## 2021-01-30 NOTE — TELEPHONE ENCOUNTER
Patient needs refills for Rivaroxaban 20 mg once a day. that dose was changed when he was hospitalized. Refill pended for approval. Patient only has a few more pills left.

## 2021-02-01 ENCOUNTER — TELEPHONE (OUTPATIENT)
Dept: HEMATOLOGY/ONCOLOGY | Facility: HOSPITAL | Age: 51
End: 2021-02-01

## 2021-02-01 LAB
ALBUMIN SERPL ELPH-MCNC: 4.67 G/DL (ref 3.75–5.21)
ALBUMIN/GLOB SERPL: 1.44 {RATIO} (ref 1–2)
ALPHA1 GLOB SERPL ELPH-MCNC: 0.36 G/DL (ref 0.19–0.46)
ALPHA2 GLOB SERPL ELPH-MCNC: 0.75 G/DL (ref 0.48–1.05)
B-GLOBULIN SERPL ELPH-MCNC: 0.98 G/DL (ref 0.68–1.23)
GAMMA GLOB SERPL ELPH-MCNC: 1.14 G/DL (ref 0.62–1.7)
M PROTEIN MFR SERPL ELPH: 7.9 G/DL (ref 6.4–8.2)

## 2021-02-01 NOTE — TELEPHONE ENCOUNTER
Called the patient's significant other, Janessa Abad, regarding recent evaluation for possible MGUS. Janessa Abad informs me that Joon Peterson has given her permission to review his results with her. She mentions signing paperwork to that effect.   She mentions Joon Peterson is unavail

## 2021-02-02 ENCOUNTER — TELEPHONE (OUTPATIENT)
Dept: HEMATOLOGY/ONCOLOGY | Facility: HOSPITAL | Age: 51
End: 2021-02-02

## 2021-02-02 NOTE — TELEPHONE ENCOUNTER
Patient was returning Dr. Jasmin Gomez call. I could not get a hold of anybody. Anyway, Patient stated he could not understand Dr. Jasmin Gomez VM but patient thinks that Dr. Jf Urban was just asking permission of patient to talk to Ai Bone, (patient's girlfriend). Patient told me it was fine for Dr. Jf Urban to speak to [facility]. If there is anything else Dr. fJ Urban needs to talk to patient about please call him. Thank you.  Mónica

## 2021-02-02 NOTE — TELEPHONE ENCOUNTER
Called the pt and left a message for him to call me back. Lynne Jackson MD  [place] [address] [facility]  [address] [place] [address]  [address] [facility], [place] [address], [facility]

## 2021-02-10 ENCOUNTER — OFFICE VISIT (OUTPATIENT)
Dept: SURGERY | Facility: CLINIC | Age: 51
End: 2021-02-10
Payer: COMMERCIAL

## 2021-02-10 VITALS — RESPIRATION RATE: 16 BRPM | HEART RATE: 77 BPM | DIASTOLIC BLOOD PRESSURE: 82 MMHG | SYSTOLIC BLOOD PRESSURE: 128 MMHG

## 2021-02-10 DIAGNOSIS — R31.29 MICROSCOPIC HEMATURIA: Primary | ICD-10-CM

## 2021-02-10 DIAGNOSIS — N28.89 CALYCEAL DIVERTICULUM OF KIDNEY: ICD-10-CM

## 2021-02-10 PROCEDURE — 3079F DIAST BP 80-89 MM HG: CPT | Performed by: UROLOGY

## 2021-02-10 PROCEDURE — 99204 OFFICE O/P NEW MOD 45 MIN: CPT | Performed by: UROLOGY

## 2021-02-10 PROCEDURE — 3074F SYST BP LT 130 MM HG: CPT | Performed by: UROLOGY

## 2021-02-10 NOTE — PROGRESS NOTES
[facility], [facility] Urology  Initial Office Consultation    HPI:   Corina Mckeon is a 48year old male here today for consultation at the request of, and a copy of this note will be sent to, Cathryn Oppenheim, MD.  Accompanied by his girlfriend.     1. Microscopic well-developed and well-nourished. No distress. HENT:   Head: Normocephalic and atraumatic. Eyes: Conjunctivae are normal.   Neck: Neck supple. Cardiovascular: Normal rate. Pulmonary/Chest: Effort normal.   Abdominal: Soft.  He exhibits no distensi hematuria.  We went over the relevant anatomy as well as the different possible etiologies and differential diagnoses including benign causes such as infections, stones, recent instrumentation of the genitourinary tract, or benign enlargement of the prostat Li Gallagher MD  2/10/2021

## 2021-02-21 ENCOUNTER — HOSPITAL ENCOUNTER (OUTPATIENT)
Dept: CT IMAGING | Facility: HOSPITAL | Age: 51
Discharge: HOME | End: 2021-02-21
Attending: INTERNAL MEDICINE
Payer: COMMERCIAL

## 2021-02-21 DIAGNOSIS — R93.89 ABNORMAL CT SCAN, CHEST: ICD-10-CM

## 2021-02-21 PROCEDURE — 71250 CT THORAX DX C-: CPT | Performed by: INTERNAL MEDICINE

## 2021-02-23 ENCOUNTER — OFFICE VISIT (OUTPATIENT)
Dept: PULMONOLOGY | Facility: CLINIC | Age: 51
End: 2021-02-23
Payer: COMMERCIAL

## 2021-02-23 VITALS
OXYGEN SATURATION: 97 % | TEMPERATURE: 99 F | RESPIRATION RATE: 18 BRPM | DIASTOLIC BLOOD PRESSURE: 89 MMHG | SYSTOLIC BLOOD PRESSURE: 143 MMHG | WEIGHT: 202.19 LBS | BODY MASS INDEX: 26.8 KG/M2 | HEART RATE: 79 BPM | HEIGHT: 73 IN

## 2021-02-23 DIAGNOSIS — I26.99 PULMONARY INFARCT (HCC): Primary | ICD-10-CM

## 2021-02-23 PROCEDURE — 3008F BODY MASS INDEX DOCD: CPT | Performed by: INTERNAL MEDICINE

## 2021-02-23 PROCEDURE — 99214 OFFICE O/P EST MOD 30 MIN: CPT | Performed by: INTERNAL MEDICINE

## 2021-02-23 PROCEDURE — 3077F SYST BP >= 140 MM HG: CPT | Performed by: INTERNAL MEDICINE

## 2021-02-23 PROCEDURE — 3079F DIAST BP 80-89 MM HG: CPT | Performed by: INTERNAL MEDICINE

## 2021-02-23 NOTE — PROGRESS NOTES
The patient is a 57-year-old male who Iban from prior evaluation comes in now for follow-up. He had a pulmonary embolism on December 15. He also had blood clot in the left popliteal.  He did well clinically.   He had a [place] hump with infiltrates

## 2021-02-28 RX ORDER — RIVAROXABAN 20 MG/1
TABLET, FILM COATED ORAL
Qty: 30 TABLET | Refills: 0 | Status: SHIPPED | OUTPATIENT
Start: 2021-02-28 | End: 2021-03-30

## 2021-03-02 NOTE — ADDENDUM NOTE
Addended by: Anne Howard on: 2/24/2021 08:54 AM     Modules accepted: Orders
Addended by: Ko Dominguez on: 3/2/2021 05:42 PM     Modules accepted: Orders
DISPLAY PLAN FREE TEXT

## 2021-03-30 RX ORDER — RIVAROXABAN 20 MG/1
TABLET, FILM COATED ORAL
Qty: 30 TABLET | Refills: 0 | Status: SHIPPED | OUTPATIENT
Start: 2021-03-30 | End: 2021-04-27

## 2021-04-12 DIAGNOSIS — Z23 NEED FOR VACCINATION: ICD-10-CM

## 2021-04-27 RX ORDER — RIVAROXABAN 20 MG/1
TABLET, FILM COATED ORAL
Qty: 30 TABLET | Refills: 0 | OUTPATIENT
Start: 2021-04-27

## 2021-06-02 RX ORDER — RIVAROXABAN 20 MG/1
TABLET, FILM COATED ORAL
Qty: 30 TABLET | Refills: 0 | Status: SHIPPED | OUTPATIENT
Start: 2021-06-02 | End: 2021-07-01

## 2021-07-01 RX ORDER — RIVAROXABAN 20 MG/1
TABLET, FILM COATED ORAL
Qty: 30 TABLET | Refills: 0 | Status: SHIPPED | OUTPATIENT
Start: 2021-07-01 | End: 2021-08-03

## 2021-08-03 ENCOUNTER — TELEPHONE (OUTPATIENT)
Dept: INTERNAL MEDICINE CLINIC | Facility: CLINIC | Age: 51
End: 2021-08-03

## 2021-08-03 ENCOUNTER — TELEPHONE (OUTPATIENT)
Dept: PULMONOLOGY | Facility: CLINIC | Age: 51
End: 2021-08-03

## 2021-08-03 NOTE — TELEPHONE ENCOUNTER
Patient called and wanted to know that now he has stopped taking the Xarelto (per  July was hi last month). He is now supposed t be taking a baby Asprin. They wanted to know for how long he needs to be on baby Asprin?  And IF he is even supposed to be on

## 2021-08-03 NOTE — TELEPHONE ENCOUNTER
Spoke with patient's Lisa Yohan (ELLIE on file) informed her of Dr. Winters Finely message/order. Kalin verbalized understanding, states patient only has 1 pill remaining of Xarelto.     Dr. Felicita Carter - Patient has 1 pill of Xarelto left, please review/sign pende

## 2021-08-03 NOTE — TELEPHONE ENCOUNTER
pts matt has questions about adjusting the pts Xarelto med and should the pt start taking baby aspirin? Pt. Just took his last pill of Xarelto today should pt Continue to take Xarelto.   Pts matt wants to know if the pt should get an Ultrasound test of

## 2021-08-03 NOTE — TELEPHONE ENCOUNTER
Abhinav Callahan called on behalf of patient on status of msg. I reviewed the chart. It appears patient should be contacting Dr. Janneth Machuca office for further directions. He placed orders US doppler to have done.  Transferred to Dr. Janneth Machuca office to confirm steps pa

## 2021-08-03 NOTE — TELEPHONE ENCOUNTER
RN, the patient was supposed to get bilateral lower extremity venous ultrasound prior to discontinuance of the Xarelto in June. It looks like those studies were not done. Please facilitate bilateral lower extremity venous ultrasound.   There is an order i

## 2021-08-25 ENCOUNTER — TELEPHONE (OUTPATIENT)
Dept: HEMATOLOGY/ONCOLOGY | Facility: HOSPITAL | Age: 51
End: 2021-08-25

## 2021-08-25 NOTE — TELEPHONE ENCOUNTER
I left a message that he is due for bone survey and to call central schedule to schedule an appointment.

## 2021-08-30 ENCOUNTER — TELEPHONE (OUTPATIENT)
Dept: HEMATOLOGY/ONCOLOGY | Facility: HOSPITAL | Age: 51
End: 2021-08-30

## 2021-08-31 ENCOUNTER — HOSPITAL ENCOUNTER (OUTPATIENT)
Dept: ULTRASOUND IMAGING | Facility: HOSPITAL | Age: 51
Discharge: HOME | End: 2021-08-31
Attending: INTERNAL MEDICINE
Payer: COMMERCIAL

## 2021-08-31 DIAGNOSIS — I26.99 PULMONARY INFARCT (HCC): ICD-10-CM

## 2021-08-31 PROCEDURE — 93970 EXTREMITY STUDY: CPT | Performed by: INTERNAL MEDICINE

## 2021-09-02 ENCOUNTER — TELEPHONE (OUTPATIENT)
Dept: PULMONOLOGY | Facility: CLINIC | Age: 51
End: 2021-09-02

## 2021-09-08 ENCOUNTER — TELEPHONE (OUTPATIENT)
Dept: PULMONOLOGY | Facility: CLINIC | Age: 51
End: 2021-09-08

## 2021-09-08 DIAGNOSIS — I82.562 CHRONIC DEEP VEIN THROMBOSIS (DVT) OF CALF MUSCLE VEIN OF LEFT LOWER EXTREMITY (HCC): Primary | ICD-10-CM

## 2021-09-08 NOTE — TELEPHONE ENCOUNTER
I spoke with the patient's girlfriend Marcia John regarding the results of the test.  I will decrease the dose of his Xarelto to 10 mg daily and he will see me again in 6 months and get a lower extremity venous ultrasound prior.

## 2021-09-09 ENCOUNTER — TELEPHONE (OUTPATIENT)
Dept: HEMATOLOGY/ONCOLOGY | Facility: HOSPITAL | Age: 51
End: 2021-09-09

## 2021-09-09 NOTE — TELEPHONE ENCOUNTER
Tanika Aguayo MD         9/8/21 4:25 PM  Note     I spoke with the patient's girlfriend Sharona Flynn regarding the results of the test.  I will decrease the dose of his Xarelto to 10 mg daily and he will see me again in 6 months and get a lower extremity veno

## 2021-09-09 NOTE — TELEPHONE ENCOUNTER
Got a hold of patient and encouraged him to get his bone scan and make follow up appointment. Patient stated he is not interested at this time as he is to busy. Patient states he will call back to make follow up appointment.

## 2021-09-13 RX ORDER — RIVAROXABAN 20 MG/1
TABLET, FILM COATED ORAL
Qty: 30 TABLET | Refills: 1 | OUTPATIENT
Start: 2021-09-13

## 2021-09-14 ENCOUNTER — TELEPHONE (OUTPATIENT)
Dept: PULMONOLOGY | Facility: CLINIC | Age: 51
End: 2021-09-14

## 2021-09-14 NOTE — TELEPHONE ENCOUNTER
Pts girlfriend called to speak to RN about problem with refill on Xarelto 10 mg (dosage change per Dr. Kylah Tobar). She states pharmacy is saying new dose will need prior auth. Pt will be out of medication on Saturday. Please call.         Current Outpatient

## 2021-09-15 NOTE — TELEPHONE ENCOUNTER
Fax received for prior auth but no number listed on form to initiate. Contacted [facility] to obtain number. [address] [phone]. Spoke with [place] at [address], prior Susan Kumar initiated. Prior auth for Xarelto 10mg approved.      Case ID# [id number]   Au

## 2021-09-15 NOTE — TELEPHONE ENCOUNTER
No fax received regarding prior auth for Xarelto. Contacted [facility] regarding this, prior auth to be re-faxed. Verified fax number. Patient's girlfriend Chantelle Shirley (ELLIE on file) updated and will get call back with any further updates.

## 2021-09-15 NOTE — TELEPHONE ENCOUNTER
Pt girlfriend called in to follow up on this stating it is urgent.  Please follow up ph # [phone]

## 2022-03-09 RX ORDER — RIVAROXABAN 10 MG/1
TABLET, FILM COATED ORAL
Qty: 30 TABLET | Refills: 5 | Status: SHIPPED | OUTPATIENT
Start: 2022-03-09

## 2022-03-09 NOTE — TELEPHONE ENCOUNTER
Rx request for Xarelto 10 mg, please review and sign off if appropriate. Thank you.     Last seen: 2/23/21  Last refill: 9/8/21

## 2022-04-12 ENCOUNTER — TELEPHONE (OUTPATIENT)
Dept: PULMONOLOGY | Facility: CLINIC | Age: 52
End: 2022-04-12

## 2022-04-12 NOTE — TELEPHONE ENCOUNTER
Patient is overdue for US venous doppler bilateral legs as ordered by Dr. Amado [facility]. Letter sent to patient.

## 2022-05-13 ENCOUNTER — TELEPHONE (OUTPATIENT)
Dept: PULMONOLOGY | Facility: CLINIC | Age: 52
End: 2022-05-13

## 2022-09-12 ENCOUNTER — TELEPHONE (OUTPATIENT)
Dept: PULMONOLOGY | Facility: CLINIC | Age: 52
End: 2022-09-12

## 2022-09-12 DIAGNOSIS — I26.99 PULMONARY INFARCT (HCC): Primary | ICD-10-CM

## 2022-09-12 RX ORDER — RIVAROXABAN 10 MG/1
TABLET, FILM COATED ORAL
Qty: 30 TABLET | Refills: 5 | OUTPATIENT
Start: 2022-09-12

## 2022-09-12 NOTE — TELEPHONE ENCOUNTER
Patients emergency contact Bruce Duran calling to speak with nurse regarding script refill for rx Xarelto, out of medication today. Please call at [phone],[email or web address].   *Informed to contact [facility] to request e-script

## 2022-09-13 RX ORDER — RIVAROXABAN 10 MG/1
TABLET, FILM COATED ORAL
Qty: 30 TABLET | Refills: 5 | OUTPATIENT
Start: 2022-09-13

## 2022-10-06 ENCOUNTER — TELEPHONE (OUTPATIENT)
Dept: PULMONOLOGY | Facility: CLINIC | Age: 52
End: 2022-10-06

## 2022-10-06 NOTE — TELEPHONE ENCOUNTER
Spoke to patient who stated issue with Xarelto at [employer] PA is needed. PA completed through portal and approved. Spoke to pharmacy to verify and ask to prepare for pickup. Patient informed.

## 2022-10-13 ENCOUNTER — TELEPHONE (OUTPATIENT)
Dept: PULMONOLOGY | Facility: CLINIC | Age: 52
End: 2022-10-13

## 2024-08-16 NOTE — TELEPHONE ENCOUNTER
RN, okay to do without contrast Please follow up with Dr. Hernandez on September 6th at 10:00 am in the outpatient office. Call [phone] to confirm appointment.     Please follow up with Dr. Samuel (pain management) outpatient.     Please follow up with your primary care provider.     Please follow up with outpatient psychiatry.

## (undated) NOTE — LETTER
[facility] 3W/SW  [address] [address]  Dept: [phone]  Loc: [phone]    20  ? Re: John Chaparro  : 7/10/1970    ?   To Whom It May Concern:    John Chaparro was admitted to [facility] AND [facility] from 12/15/20 to 20

## (undated) NOTE — LETTER
12/28/2020          To Whom It May Concern:    Art Harman is currently under my medical care and may not return to work at this time. Please excuse Robel Bell for the time missed. He may return to work on 01/04/2020. Activity is restricted as follows: none.

## (undated) NOTE — LETTER
[facility] [address] [place] [id number]  Dept: [phone]      1/2/2021      To Whom It May Concern:    Darnell Ramirez is currently under my medical care and may not return to at this time.     Please excuse

## (undated) NOTE — LETTER
12/22/2020          To Whom It May Concern:    Elma Huerta is currently under my medical care and may return to work on 12/28/2020. Activity is restricted as follows: none. If you require additional information please contact our office.         Since

## (undated) NOTE — LETTER
5/13/2022              [address]         Dear Belen Suggs,    Our records indicate that the tests ordered for you by Sonja Rivera MD  have not been done. (Venous Doppler)  If you have, in fact, already completed the tests or you do not wish to have the tests done, please contact our office at [address] [place] [address]. Otherwise, please proceed with the testing. Enclosed is a duplicate order for your convenience.         Sincerely,    Sonja Rivera MD  [address] [place] [address] [facility] [place], [place]  [address]  [phone]        Document electronically generated by:  Chaya Goodrich MA

## (undated) NOTE — LETTER
[facility] 3W/SW  [address] [address]  Dept: [phone]  Loc: [phone]    20  ? Re: Fredy Johnson  : 7/10/1970    ?   To Whom It May Concern:    Fredy Johnson was admitted to [facility] AND [facility] from 12/15/20 to 20